# Patient Record
Sex: FEMALE | Race: BLACK OR AFRICAN AMERICAN | NOT HISPANIC OR LATINO | URBAN - METROPOLITAN AREA
[De-identification: names, ages, dates, MRNs, and addresses within clinical notes are randomized per-mention and may not be internally consistent; named-entity substitution may affect disease eponyms.]

---

## 2020-09-20 ENCOUNTER — OUT OF OFFICE VISIT (OUTPATIENT)
Dept: URBAN - METROPOLITAN AREA MEDICAL CENTER 12 | Facility: MEDICAL CENTER | Age: 35
End: 2020-09-20
Payer: MEDICARE

## 2020-09-20 DIAGNOSIS — R11.11 NON-INTRACTABLE VOMITING WITHOUT NAUSEA, UNSPECIFIED VOMITING TYPE: ICD-10-CM

## 2020-09-20 PROCEDURE — G8427 DOCREV CUR MEDS BY ELIG CLIN: HCPCS | Performed by: INTERNAL MEDICINE

## 2020-09-20 PROCEDURE — 99222 1ST HOSP IP/OBS MODERATE 55: CPT | Performed by: INTERNAL MEDICINE

## 2020-09-21 ENCOUNTER — OUT OF OFFICE VISIT (OUTPATIENT)
Dept: URBAN - METROPOLITAN AREA MEDICAL CENTER 12 | Facility: MEDICAL CENTER | Age: 35
End: 2020-09-21
Payer: MEDICARE

## 2020-09-21 DIAGNOSIS — Z79.01 ANTICOAGULANT LONG-TERM USE: ICD-10-CM

## 2020-09-21 DIAGNOSIS — R10.84 ABDOMINAL CRAMPING, GENERALIZED: ICD-10-CM

## 2020-09-21 DIAGNOSIS — I82.629 DVT OF UPPER EXTREMITY (DEEP VEIN THROMBOSIS): ICD-10-CM

## 2020-09-21 DIAGNOSIS — R11.2 ACUTE NAUSEA WITH NONBILIOUS VOMITING: ICD-10-CM

## 2020-09-21 PROCEDURE — 99233 SBSQ HOSP IP/OBS HIGH 50: CPT | Performed by: INTERNAL MEDICINE

## 2020-09-21 PROCEDURE — 99231 SBSQ HOSP IP/OBS SF/LOW 25: CPT | Performed by: INTERNAL MEDICINE

## 2020-09-21 PROCEDURE — 99232 SBSQ HOSP IP/OBS MODERATE 35: CPT | Performed by: INTERNAL MEDICINE

## 2020-09-23 ENCOUNTER — OUT OF OFFICE VISIT (OUTPATIENT)
Dept: URBAN - METROPOLITAN AREA MEDICAL CENTER 12 | Facility: MEDICAL CENTER | Age: 35
End: 2020-09-23
Payer: MEDICARE

## 2020-09-23 DIAGNOSIS — R11.11 VOMITING WITHOUT NAUSEA: ICD-10-CM

## 2020-09-23 DIAGNOSIS — K31.84 DIABETIC GASTROPARESIS: ICD-10-CM

## 2020-09-23 DIAGNOSIS — K31.89 ACQUIRED DEFORMITY OF DUODENUM: ICD-10-CM

## 2020-09-23 DIAGNOSIS — K22.8 COLUMNAR-LINED ESOPHAGUS: ICD-10-CM

## 2020-09-23 PROCEDURE — 43239 EGD BIOPSY SINGLE/MULTIPLE: CPT | Performed by: INTERNAL MEDICINE

## 2020-09-23 PROCEDURE — 43236 UPPR GI SCOPE W/SUBMUC INJ: CPT | Performed by: INTERNAL MEDICINE

## 2020-11-09 ENCOUNTER — OUT OF OFFICE VISIT (OUTPATIENT)
Dept: URBAN - METROPOLITAN AREA MEDICAL CENTER 12 | Facility: MEDICAL CENTER | Age: 35
End: 2020-11-09
Payer: MEDICARE

## 2020-11-09 DIAGNOSIS — K59.09 CHRONIC CONSTIPATION: ICD-10-CM

## 2020-11-09 DIAGNOSIS — R56.9 SEIZURE: ICD-10-CM

## 2020-11-09 DIAGNOSIS — R11.2 ACUTE NAUSEA WITH NONBILIOUS VOMITING: ICD-10-CM

## 2020-11-09 DIAGNOSIS — E10.43 TYPE 1 DIABETES MELLITUS WITH DIABETIC AUTONOMIC (POLY)NEUROPATHY: ICD-10-CM

## 2020-11-09 DIAGNOSIS — D64.89 ANEMIA DUE TO OTHER CAUSE: ICD-10-CM

## 2020-11-09 DIAGNOSIS — R10.84 ABDOMINAL CRAMPING, GENERALIZED: ICD-10-CM

## 2020-11-09 PROCEDURE — 99232 SBSQ HOSP IP/OBS MODERATE 35: CPT | Performed by: INTERNAL MEDICINE

## 2020-11-09 PROCEDURE — 99222 1ST HOSP IP/OBS MODERATE 55: CPT | Performed by: INTERNAL MEDICINE

## 2020-11-09 PROCEDURE — G8427 DOCREV CUR MEDS BY ELIG CLIN: HCPCS | Performed by: INTERNAL MEDICINE

## 2020-11-17 ENCOUNTER — OUT OF OFFICE VISIT (OUTPATIENT)
Dept: URBAN - METROPOLITAN AREA MEDICAL CENTER 12 | Facility: MEDICAL CENTER | Age: 35
End: 2020-11-17
Payer: MEDICARE

## 2020-11-17 DIAGNOSIS — R11.2 ACUTE NAUSEA WITH NONBILIOUS VOMITING: ICD-10-CM

## 2020-11-17 DIAGNOSIS — R56.9 SEIZURE: ICD-10-CM

## 2020-11-17 DIAGNOSIS — E10.43 TYPE 1 DIABETES MELLITUS WITH DIABETIC AUTONOMIC NEUROPATHY: ICD-10-CM

## 2020-11-17 DIAGNOSIS — K59.09 CHRONIC CONSTIPATION: ICD-10-CM

## 2020-11-17 PROCEDURE — 99232 SBSQ HOSP IP/OBS MODERATE 35: CPT | Performed by: INTERNAL MEDICINE

## 2020-11-17 PROCEDURE — 99233 SBSQ HOSP IP/OBS HIGH 50: CPT | Performed by: INTERNAL MEDICINE

## 2020-11-17 PROCEDURE — 99231 SBSQ HOSP IP/OBS SF/LOW 25: CPT | Performed by: INTERNAL MEDICINE

## 2020-12-22 ENCOUNTER — OFFICE VISIT (OUTPATIENT)
Dept: URBAN - METROPOLITAN AREA CLINIC 92 | Facility: CLINIC | Age: 35
End: 2020-12-22

## 2021-01-28 ENCOUNTER — TELEPHONE ENCOUNTER (OUTPATIENT)
Dept: URBAN - METROPOLITAN AREA CLINIC 25 | Facility: CLINIC | Age: 36
End: 2021-01-28

## 2021-02-08 ENCOUNTER — OFFICE VISIT (OUTPATIENT)
Dept: URBAN - METROPOLITAN AREA CLINIC 25 | Facility: CLINIC | Age: 36
End: 2021-02-08
Payer: MEDICARE

## 2021-02-08 DIAGNOSIS — K44.9 HIATAL HERNIA: ICD-10-CM

## 2021-02-08 DIAGNOSIS — K31.84 GASTROPARESIS: ICD-10-CM

## 2021-02-08 DIAGNOSIS — Z86.718 HISTORY OF DVT (DEEP VEIN THROMBOSIS): ICD-10-CM

## 2021-02-08 DIAGNOSIS — E10.69 TYPE 1 DIABETES MELLITUS WITH OTHER SPECIFIED COMPLICATION: ICD-10-CM

## 2021-02-08 DIAGNOSIS — Z93.4 GASTROJEJUNOSTOMY TUBE STATUS: ICD-10-CM

## 2021-02-08 DIAGNOSIS — I10 HYPERTENSION, UNSPECIFIED TYPE: ICD-10-CM

## 2021-02-08 DIAGNOSIS — E11.43 TYPE 2 DIABETES MELLITUS WITH DIABETIC AUTONOMIC (POLY)NEUROPATHY: ICD-10-CM

## 2021-02-08 DIAGNOSIS — E78.5 HYPERLIPIDEMIA, UNSPECIFIED HYPERLIPIDEMIA TYPE: ICD-10-CM

## 2021-02-08 PROBLEM — 55822004: Status: ACTIVE | Noted: 2021-02-08

## 2021-02-08 PROBLEM — 713704004: Status: ACTIVE | Noted: 2021-02-08

## 2021-02-08 PROBLEM — 46635009: Status: ACTIVE | Noted: 2021-02-08

## 2021-02-08 PROBLEM — 235675006: Status: ACTIVE | Noted: 2021-02-08

## 2021-02-08 PROBLEM — 38341003: Status: ACTIVE | Noted: 2021-02-08

## 2021-02-08 PROCEDURE — 99213 OFFICE O/P EST LOW 20 MIN: CPT | Performed by: INTERNAL MEDICINE

## 2021-02-08 PROCEDURE — G8752 SYS BP LESS 140: HCPCS | Performed by: INTERNAL MEDICINE

## 2021-02-08 PROCEDURE — G9903 PT SCRN TBCO ID AS NON USER: HCPCS | Performed by: INTERNAL MEDICINE

## 2021-02-08 PROCEDURE — 1036F TOBACCO NON-USER: CPT | Performed by: INTERNAL MEDICINE

## 2021-02-08 PROCEDURE — G8483 FLU IMM NO ADMIN DOC REA: HCPCS | Performed by: INTERNAL MEDICINE

## 2021-02-08 PROCEDURE — G8427 DOCREV CUR MEDS BY ELIG CLIN: HCPCS | Performed by: INTERNAL MEDICINE

## 2021-02-08 PROCEDURE — G8420 CALC BMI NORM PARAMETERS: HCPCS | Performed by: INTERNAL MEDICINE

## 2021-02-08 PROCEDURE — G8754 DIAS BP LESS 90: HCPCS | Performed by: INTERNAL MEDICINE

## 2021-03-15 ENCOUNTER — TELEPHONE ENCOUNTER (OUTPATIENT)
Dept: URBAN - METROPOLITAN AREA CLINIC 25 | Facility: CLINIC | Age: 36
End: 2021-03-15

## 2021-03-19 ENCOUNTER — TELEPHONE ENCOUNTER (OUTPATIENT)
Dept: URBAN - METROPOLITAN AREA SURGERY CENTER 30 | Facility: SURGERY CENTER | Age: 36
End: 2021-03-19

## 2023-01-03 ENCOUNTER — OUT OF OFFICE VISIT (OUTPATIENT)
Dept: URBAN - METROPOLITAN AREA MEDICAL CENTER 12 | Facility: MEDICAL CENTER | Age: 38
End: 2023-01-03
Payer: MEDICARE

## 2023-01-03 DIAGNOSIS — Z87.19 ESOPHAGEAL FOOD BOLUS: ICD-10-CM

## 2023-01-03 DIAGNOSIS — D64.89 ANEMIA DUE TO OTHER CAUSE: ICD-10-CM

## 2023-01-03 DIAGNOSIS — R10.84 ABDOMINAL CRAMPING, GENERALIZED: ICD-10-CM

## 2023-01-03 DIAGNOSIS — R11.2 ACUTE NAUSEA WITH NONBILIOUS VOMITING: ICD-10-CM

## 2023-01-03 PROCEDURE — G8427 DOCREV CUR MEDS BY ELIG CLIN: HCPCS | Performed by: PHYSICIAN ASSISTANT

## 2023-01-03 PROCEDURE — 99222 1ST HOSP IP/OBS MODERATE 55: CPT | Performed by: PHYSICIAN ASSISTANT

## 2023-01-03 PROCEDURE — 99232 SBSQ HOSP IP/OBS MODERATE 35: CPT | Performed by: PHYSICIAN ASSISTANT

## 2023-01-04 ENCOUNTER — OUT OF OFFICE VISIT (OUTPATIENT)
Dept: URBAN - METROPOLITAN AREA MEDICAL CENTER 12 | Facility: MEDICAL CENTER | Age: 38
End: 2023-01-04
Payer: MEDICARE

## 2023-01-04 DIAGNOSIS — D13.2 ADENOMA OF DUODENUM: ICD-10-CM

## 2023-01-04 DIAGNOSIS — K22.89 DILATATION OF ESOPHAGUS: ICD-10-CM

## 2023-01-04 DIAGNOSIS — K29.60 ADENOPAPILLOMATOSIS GASTRICA: ICD-10-CM

## 2023-01-04 PROCEDURE — 43251 EGD REMOVE LESION SNARE: CPT | Performed by: INTERNAL MEDICINE

## 2023-01-04 PROCEDURE — 43239 EGD BIOPSY SINGLE/MULTIPLE: CPT | Performed by: INTERNAL MEDICINE

## 2023-01-18 ENCOUNTER — TELEPHONE ENCOUNTER (OUTPATIENT)
Dept: URBAN - METROPOLITAN AREA CLINIC 92 | Facility: CLINIC | Age: 38
End: 2023-01-18

## 2023-01-30 ENCOUNTER — OFFICE VISIT (OUTPATIENT)
Dept: URBAN - METROPOLITAN AREA CLINIC 92 | Facility: CLINIC | Age: 38
End: 2023-01-30

## 2023-01-30 NOTE — HPI-TODAY'S VISIT:
1/3/23 Edna Jeffrey is a 37 y.o. female with past medical history significant for ESRD on HD, poorly-controlled IDDM c/b gastroparesis, HTN, PNES, HTN, visual impairment, depression, insomnia, morbid obesity who presented with complaint of nausea, vomiting, abdominal pain.  GI consult requested.   Patient reports a 2-day history of inability to tolerate po intake, intractable nausea and vomiting. She was with complaint of diffuse abdominal pain yesterday, but that has since resolved. Today, she continues to endorse to nausea and vomiting. Clear liquid tray at bedside, she states she has been unable to tolerate without vomiting.  She has not had a bowel movement in several days. She denies chest pain, fevers, melena, hematochezia, diarrhea, hematemesis.   She was recently admitted to [facility] from 12/13 to 12/19 for sepsis due to staph capitis and discharged home on vancomycin (10-day course, yet unable to complete course due to N/V).   Patient known to [facility], last seen at [facility] in 11/2020 for GP flare, and was seen by IR with placement of G-J tube. G-J tube has since been removed. She does not have interest in having this replaced, states it caused her pain and did not help.  History of EGD 09/23/20 revealed z-line irregular at GEJ, 2 cm hiatal hernia, erythematous mucosa in gastric body and antrum, prepyloric region of stomach successfully injected with Botox. She states she did not find relief after Botox.  She denies use of NSAIDs. She endorses to history of marijuana use, but not current. She states when her blood sugars are well-controlled she sees improvement in symptoms.   During this admission, she was noted with QTC prolongation. EGD - [place]-colored mucosa. Biopsied.(	Squamous mucosa with rare superficial intraepithelial neutrophils and reactive changes. 	Minute strips of detached desquamated parakeratotic debris with occasional lymphocytes. )Z-line irregular, 40 cm from the incisors.Gastritis-Biopsied.A 5mm single Duodenal adenoma.Use Protonix (pantoprazole) 40 mg PO BID. NEED EGD IN 3 MONTHS    -Home regimen: Phenergan 25 mg PO Q6H PRN.  She says that Zofran and reglan is always ineffective for her and her QTc interval from last admission > 500 ms.  GI consulted, and rec avoiding antiemetics that further prolong Qtc, start on Tigan and stop ativan due to sedation.  However patient refused IM Tigan.  She underwent EGD with slight gastritis and salmon coloured discoloration, with biopsies taken, continued on PPI BID.  Her diet was advanced and she tolerated

## 2023-01-31 ENCOUNTER — TELEPHONE ENCOUNTER (OUTPATIENT)
Dept: URBAN - METROPOLITAN AREA CLINIC 92 | Facility: CLINIC | Age: 38
End: 2023-01-31

## 2023-02-14 ENCOUNTER — DASHBOARD ENCOUNTERS (OUTPATIENT)
Age: 38
End: 2023-02-14

## 2023-02-15 ENCOUNTER — OFFICE VISIT (OUTPATIENT)
Dept: URBAN - METROPOLITAN AREA CLINIC 92 | Facility: CLINIC | Age: 38
End: 2023-02-15

## 2023-02-15 NOTE — HPI-TODAY'S VISIT:
1/3/23 Edna Jeffrey is a 37 y.o. female with past medical history significant for ESRD on HD, poorly-controlled IDDM c/b gastroparesis, HTN, PNES, HTN, visual impairment, depression, insomnia, morbid obesity who presented with complaint of nausea, vomiting, abdominal pain.  GI consult requested.   Patient reports a 2-day history of inability to tolerate po intake, intractable nausea and vomiting. She was with complaint of diffuse abdominal pain yesterday, but that has since resolved. Today, she continues to endorse to nausea and vomiting. Clear liquid tray at bedside, she states she has been unable to tolerate without vomiting.  She has not had a bowel movement in several days. She denies chest pain, fevers, melena, hematochezia, diarrhea, hematemesis.   She was recently admitted to [facility] from 12/13 to 12/19 for sepsis due to staph capitis and discharged home on vancomycin (10-day course, yet unable to complete course due to N/V).   Patient known to [facility], last seen at [facility] in 11/2020 for GP flare, and was seen by IR with placement of G-J tube. G-J tube has since been removed. She does not have interest in having this replaced, states it caused her pain and did not help.  History of EGD 09/23/20 revealed z-line irregular at GEJ, 2 cm hiatal hernia, erythematous mucosa in gastric body and antrum, prepyloric region of stomach successfully injected with Botox. She states she did not find relief after Botox.  She denies use of NSAIDs. She endorses to history of marijuana use, but not current. She states when her blood sugars are well-controlled she sees improvement in symptoms.   During this admission, she was noted with QTC prolongation. EGD - [place]-colored mucosa. Biopsied.(	Squamous mucosa with rare superficial intraepithelial neutrophils and reactive changes. 	Minute strips of detached desquamated parakeratotic debris with occasional lymphocytes. )Z-line irregular, 40 cm from the incisors.Gastritis-Biopsied.A 5mm single Duodenal adenoma.Use Protonix (pantoprazole) 40 mg PO BID. NEED EGD IN 3 MONTHS    -Home regimen: Phenergan 25 mg PO Q6H PRN.  She says that Zofran and reglan is always ineffective for her and her QTc interval from last admission > 500 ms.  GI consulted, and rec avoiding antiemetics that further prolong Qtc, start on Tigan and stop ativan due to sedation.  However patient refused IM Tigan.  She underwent EGD with slight gastritis and salmon coloured discoloration, with biopsies taken, continued on PPI BID.  Her diet was advanced and she tolerated

## 2023-03-22 ENCOUNTER — CLAIMS CREATED FROM THE CLAIM WINDOW (OUTPATIENT)
Dept: URBAN - METROPOLITAN AREA MEDICAL CENTER 12 | Facility: MEDICAL CENTER | Age: 38
End: 2023-03-22
Payer: MEDICARE

## 2023-03-22 ENCOUNTER — CLAIMS CREATED FROM THE CLAIM WINDOW (OUTPATIENT)
Dept: URBAN - METROPOLITAN AREA MEDICAL CENTER 12 | Facility: MEDICAL CENTER | Age: 38
End: 2023-03-22

## 2023-03-22 DIAGNOSIS — K31.84 GASTROPARESIS: ICD-10-CM

## 2023-03-22 DIAGNOSIS — R11.2 ACUTE NAUSEA WITH NONBILIOUS VOMITING: ICD-10-CM

## 2023-03-22 DIAGNOSIS — R10.32 ABDOMINAL CRAMPING IN LEFT LOWER QUADRANT: ICD-10-CM

## 2023-03-22 DIAGNOSIS — E10.43 GASTROPARESIS DUE TO TYPE 1 DIABETES MELLITUS: ICD-10-CM

## 2023-03-22 PROCEDURE — G8427 DOCREV CUR MEDS BY ELIG CLIN: HCPCS | Performed by: PHYSICIAN ASSISTANT

## 2023-03-22 PROCEDURE — 99222 1ST HOSP IP/OBS MODERATE 55: CPT | Performed by: PHYSICIAN ASSISTANT

## 2023-03-23 ENCOUNTER — OUT OF OFFICE VISIT (OUTPATIENT)
Dept: URBAN - METROPOLITAN AREA MEDICAL CENTER 12 | Facility: MEDICAL CENTER | Age: 38
End: 2023-03-23

## 2023-03-23 ENCOUNTER — CLAIMS CREATED FROM THE CLAIM WINDOW (OUTPATIENT)
Dept: URBAN - METROPOLITAN AREA MEDICAL CENTER 12 | Facility: MEDICAL CENTER | Age: 38
End: 2023-03-23
Payer: MEDICARE

## 2023-03-23 DIAGNOSIS — D13.2 ADENOMA OF DUODENUM: ICD-10-CM

## 2023-03-23 PROCEDURE — 43235 EGD DIAGNOSTIC BRUSH WASH: CPT | Performed by: INTERNAL MEDICINE

## 2023-05-09 ENCOUNTER — OFFICE VISIT (OUTPATIENT)
Dept: URBAN - METROPOLITAN AREA CLINIC 92 | Facility: CLINIC | Age: 38
End: 2023-05-09

## 2023-05-09 NOTE — HPI-TODAY'S VISIT:
1/3/23 Edna Jeffrey is a 37 y.o. female with past medical history significant for ESRD on HD, poorly-controlled IDDM c/b gastroparesis, HTN, PNES, HTN, visual impairment, depression, insomnia, morbid obesity who presented with complaint of nausea, vomiting, abdominal pain.  GI consult requested.   Patient reports a 2-day history of inability to tolerate po intake, intractable nausea and vomiting. She was with complaint of diffuse abdominal pain yesterday, but that has since resolved. Today, she continues to endorse to nausea and vomiting. Clear liquid tray at bedside, she states she has been unable to tolerate without vomiting.  She has not had a bowel movement in several days. She denies chest pain, fevers, melena, hematochezia, diarrhea, hematemesis.   She was recently admitted to [facility] from 12/13 to 12/19 for sepsis due to staph capitis and discharged home on vancomycin (10-day course, yet unable to complete course due to N/V).   Patient known to [facility], last seen at [facility] in 11/2020 for GP flare, and was seen by IR with placement of G-J tube. G-J tube has since been removed. She does not have interest in having this replaced, states it caused her pain and did not help.  History of EGD 09/23/20 revealed z-line irregular at GEJ, 2 cm hiatal hernia, erythematous mucosa in gastric body and antrum, prepyloric region of stomach successfully injected with Botox. She states she did not find relief after Botox.  She denies use of NSAIDs. She endorses to history of marijuana use, but not current. She states when her blood sugars are well-controlled she sees improvement in symptoms.   During this admission, she was noted with QTC prolongation. EGD - [place]-colored mucosa. Biopsied.(	Squamous mucosa with rare superficial intraepithelial neutrophils and reactive changes. 	Minute strips of detached desquamated parakeratotic debris with occasional lymphocytes. )Z-line irregular, 40 cm from the incisors.Gastritis-Biopsied.A 5mm single Duodenal adenoma.Use Protonix (pantoprazole) 40 mg PO BID. NEED EGD IN 3 MONTHS    -Home regimen: Phenergan 25 mg PO Q6H PRN.  She says that Zofran and reglan is always ineffective for her and her QTc interval from last admission > 500 ms.  GI consulted, and rec avoiding antiemetics that further prolong Qtc, start on Tigan and stop ativan due to sedation.  However patient refused IM Tigan.  She underwent EGD with slight gastritis and salmon coloured discoloration, with biopsies taken, continued on PPI BID.  Her diet was advanced and she tolerated

## 2023-10-12 ENCOUNTER — OUT OF OFFICE VISIT (OUTPATIENT)
Dept: URBAN - METROPOLITAN AREA MEDICAL CENTER 39 | Facility: MEDICAL CENTER | Age: 38
End: 2023-10-12
Payer: MEDICARE

## 2023-10-12 DIAGNOSIS — K31.84 DECREASED MOTILITY OF STOMACH: ICD-10-CM

## 2023-10-12 DIAGNOSIS — R10.84 ABDOMINAL CRAMPING, GENERALIZED: ICD-10-CM

## 2023-10-12 DIAGNOSIS — R11.0 AM NAUSEA: ICD-10-CM

## 2023-10-12 PROCEDURE — G8427 DOCREV CUR MEDS BY ELIG CLIN: HCPCS | Performed by: INTERNAL MEDICINE

## 2023-10-12 PROCEDURE — 99222 1ST HOSP IP/OBS MODERATE 55: CPT | Performed by: INTERNAL MEDICINE

## 2024-01-26 ENCOUNTER — OUT OF OFFICE VISIT (OUTPATIENT)
Dept: URBAN - METROPOLITAN AREA MEDICAL CENTER 39 | Facility: MEDICAL CENTER | Age: 39
End: 2024-01-26
Payer: MEDICARE

## 2024-01-26 DIAGNOSIS — R11.2 ACUTE NAUSEA WITH NONBILIOUS VOMITING: ICD-10-CM

## 2024-01-26 DIAGNOSIS — K31.84 DECREASED MOTILITY OF STOMACH: ICD-10-CM

## 2024-01-26 PROCEDURE — 99222 1ST HOSP IP/OBS MODERATE 55: CPT | Performed by: INTERNAL MEDICINE

## 2024-01-26 PROCEDURE — G8427 DOCREV CUR MEDS BY ELIG CLIN: HCPCS | Performed by: INTERNAL MEDICINE

## 2024-03-11 ENCOUNTER — LAB (OUTPATIENT)
Dept: URBAN - METROPOLITAN AREA MEDICAL CENTER 9 | Facility: MEDICAL CENTER | Age: 39
End: 2024-03-11
Payer: MEDICARE

## 2024-03-11 DIAGNOSIS — R11.2 NAUSEA WITH VOMITING, UNSPECIFIED: ICD-10-CM

## 2024-03-11 DIAGNOSIS — E11.43 DIABETIC GASTROPARESIS: ICD-10-CM

## 2024-03-11 DIAGNOSIS — K31.84 GASTROPARESIS: ICD-10-CM

## 2024-03-11 PROCEDURE — G8427 DOCREV CUR MEDS BY ELIG CLIN: HCPCS | Performed by: INTERNAL MEDICINE

## 2024-03-11 PROCEDURE — 99254 IP/OBS CNSLTJ NEW/EST MOD 60: CPT | Performed by: INTERNAL MEDICINE

## 2024-03-11 PROCEDURE — 99222 1ST HOSP IP/OBS MODERATE 55: CPT | Performed by: INTERNAL MEDICINE

## 2024-03-12 ENCOUNTER — LAB (OUTPATIENT)
Dept: URBAN - METROPOLITAN AREA MEDICAL CENTER 9 | Facility: MEDICAL CENTER | Age: 39
End: 2024-03-12
Payer: MEDICARE

## 2024-03-12 DIAGNOSIS — K31.84 GASTROPARESIS: ICD-10-CM

## 2024-03-12 DIAGNOSIS — E11.43 DIABETIC GASTROPARESIS: ICD-10-CM

## 2024-03-12 DIAGNOSIS — R11.2 NAUSEA WITH VOMITING, UNSPECIFIED: ICD-10-CM

## 2024-03-12 PROCEDURE — 99231 SBSQ HOSP IP/OBS SF/LOW 25: CPT | Performed by: PHYSICIAN ASSISTANT

## 2024-06-21 ENCOUNTER — CLAIMS CREATED FROM THE CLAIM WINDOW (OUTPATIENT)
Dept: URBAN - METROPOLITAN AREA MEDICAL CENTER 33 | Facility: MEDICAL CENTER | Age: 39
End: 2024-06-21
Payer: MEDICARE

## 2024-06-21 DIAGNOSIS — E11.43 TYPE 2 DIABETES MELLITUS WITH DIABETIC AUTONOMIC (POLY)NEUROPATHY: ICD-10-CM

## 2024-06-21 DIAGNOSIS — K31.84 GASTROPARESIS: ICD-10-CM

## 2024-06-21 DIAGNOSIS — E87.1 HYPONATREMIA: ICD-10-CM

## 2024-06-21 DIAGNOSIS — R11.2 ACUTE NAUSEA WITH NONBILIOUS VOMITING: ICD-10-CM

## 2024-06-21 PROCEDURE — G8427 DOCREV CUR MEDS BY ELIG CLIN: HCPCS

## 2024-06-21 PROCEDURE — 99222 1ST HOSP IP/OBS MODERATE 55: CPT

## 2024-06-22 ENCOUNTER — CLAIMS CREATED FROM THE CLAIM WINDOW (OUTPATIENT)
Dept: URBAN - METROPOLITAN AREA MEDICAL CENTER 33 | Facility: MEDICAL CENTER | Age: 39
End: 2024-06-22
Payer: MEDICARE

## 2024-06-22 DIAGNOSIS — R11.2 ACUTE NAUSEA WITH NONBILIOUS VOMITING: ICD-10-CM

## 2024-06-22 DIAGNOSIS — K59.09 CHANGE IN BOWEL MOVEMENTS INTERMITTENT CONSTIPATION. URGENCY IN THE MORNING.: ICD-10-CM

## 2024-06-22 PROCEDURE — 99233 SBSQ HOSP IP/OBS HIGH 50: CPT | Performed by: INTERNAL MEDICINE

## 2024-06-24 ENCOUNTER — CLAIMS CREATED FROM THE CLAIM WINDOW (OUTPATIENT)
Dept: URBAN - METROPOLITAN AREA MEDICAL CENTER 33 | Facility: MEDICAL CENTER | Age: 39
End: 2024-06-24
Payer: MEDICARE

## 2024-06-24 DIAGNOSIS — K21.00 ALKALINE REFLUX ESOPHAGITIS: ICD-10-CM

## 2024-06-24 DIAGNOSIS — R11.2 ACUTE NAUSEA WITH NONBILIOUS VOMITING: ICD-10-CM

## 2024-06-24 PROCEDURE — 43236 UPPR GI SCOPE W/SUBMUC INJ: CPT | Performed by: INTERNAL MEDICINE

## 2024-06-24 PROCEDURE — 43235 EGD DIAGNOSTIC BRUSH WASH: CPT | Performed by: INTERNAL MEDICINE

## 2024-06-25 ENCOUNTER — CLAIMS CREATED FROM THE CLAIM WINDOW (OUTPATIENT)
Dept: URBAN - METROPOLITAN AREA MEDICAL CENTER 33 | Facility: MEDICAL CENTER | Age: 39
End: 2024-06-25
Payer: MEDICARE

## 2024-06-25 DIAGNOSIS — K21.00 ALKALINE REFLUX ESOPHAGITIS: ICD-10-CM

## 2024-06-25 DIAGNOSIS — R11.2 ACUTE NAUSEA WITH NONBILIOUS VOMITING: ICD-10-CM

## 2024-06-25 DIAGNOSIS — K31.84 GASTROPARESIS: ICD-10-CM

## 2024-06-25 DIAGNOSIS — E11.43 TYPE 2 DIABETES MELLITUS WITH DIABETIC AUTONOMIC (POLY)NEUROPATHY: ICD-10-CM

## 2024-06-25 PROCEDURE — 99232 SBSQ HOSP IP/OBS MODERATE 35: CPT | Performed by: INTERNAL MEDICINE

## 2024-06-26 ENCOUNTER — CLAIMS CREATED FROM THE CLAIM WINDOW (OUTPATIENT)
Dept: URBAN - METROPOLITAN AREA MEDICAL CENTER 33 | Facility: MEDICAL CENTER | Age: 39
End: 2024-06-26
Payer: MEDICARE

## 2024-06-26 DIAGNOSIS — E11.43 TYPE 2 DIABETES MELLITUS WITH DIABETIC AUTONOMIC (POLY)NEUROPATHY: ICD-10-CM

## 2024-06-26 DIAGNOSIS — K31.84 GASTROPARESIS: ICD-10-CM

## 2024-06-26 DIAGNOSIS — R11.2 ACUTE NAUSEA WITH NONBILIOUS VOMITING: ICD-10-CM

## 2024-06-26 DIAGNOSIS — K21.00 ALKALINE REFLUX ESOPHAGITIS: ICD-10-CM

## 2024-06-26 PROCEDURE — 99232 SBSQ HOSP IP/OBS MODERATE 35: CPT | Performed by: INTERNAL MEDICINE

## 2024-06-28 ENCOUNTER — CLAIMS CREATED FROM THE CLAIM WINDOW (OUTPATIENT)
Dept: URBAN - METROPOLITAN AREA MEDICAL CENTER 33 | Facility: MEDICAL CENTER | Age: 39
End: 2024-06-28

## 2024-06-28 PROCEDURE — 99232 SBSQ HOSP IP/OBS MODERATE 35: CPT | Performed by: INTERNAL MEDICINE

## 2024-08-04 ENCOUNTER — CLAIMS CREATED FROM THE CLAIM WINDOW (OUTPATIENT)
Dept: URBAN - METROPOLITAN AREA MEDICAL CENTER 33 | Facility: MEDICAL CENTER | Age: 39
End: 2024-08-04
Payer: MEDICARE

## 2024-08-04 DIAGNOSIS — K31.84 DECREASED MOTILITY OF STOMACH: ICD-10-CM

## 2024-08-04 DIAGNOSIS — R11.2 ACUTE NAUSEA WITH NONBILIOUS VOMITING: ICD-10-CM

## 2024-08-04 DIAGNOSIS — E10.65 DIABETES MELLITUS TYPE 1, UNCONTROLLED, INSULIN DEPENDENT: ICD-10-CM

## 2024-08-04 PROCEDURE — 99223 1ST HOSP IP/OBS HIGH 75: CPT | Performed by: INTERNAL MEDICINE

## 2024-08-04 PROCEDURE — G8427 DOCREV CUR MEDS BY ELIG CLIN: HCPCS | Performed by: INTERNAL MEDICINE

## 2024-08-05 ENCOUNTER — CLAIMS CREATED FROM THE CLAIM WINDOW (OUTPATIENT)
Dept: URBAN - METROPOLITAN AREA MEDICAL CENTER 33 | Facility: MEDICAL CENTER | Age: 39
End: 2024-08-05
Payer: MEDICARE

## 2024-08-05 DIAGNOSIS — K31.84 DECREASED MOTILITY OF STOMACH: ICD-10-CM

## 2024-08-05 DIAGNOSIS — E10.65 DIABETES MELLITUS TYPE 1, UNCONTROLLED, INSULIN DEPENDENT: ICD-10-CM

## 2024-08-05 DIAGNOSIS — R11.2 ACUTE NAUSEA WITH NONBILIOUS VOMITING: ICD-10-CM

## 2024-08-05 DIAGNOSIS — D64.89 ANEMIA DUE TO OTHER CAUSE: ICD-10-CM

## 2024-08-05 PROCEDURE — 99233 SBSQ HOSP IP/OBS HIGH 50: CPT

## 2024-08-06 ENCOUNTER — CLAIMS CREATED FROM THE CLAIM WINDOW (OUTPATIENT)
Dept: URBAN - METROPOLITAN AREA MEDICAL CENTER 33 | Facility: MEDICAL CENTER | Age: 39
End: 2024-08-06
Payer: MEDICARE

## 2024-08-06 DIAGNOSIS — K59.09 CHANGE IN BOWEL MOVEMENTS INTERMITTENT CONSTIPATION. URGENCY IN THE MORNING.: ICD-10-CM

## 2024-08-06 DIAGNOSIS — D64.89 ANEMIA DUE TO OTHER CAUSE: ICD-10-CM

## 2024-08-06 DIAGNOSIS — R11.2 ACUTE NAUSEA WITH NONBILIOUS VOMITING: ICD-10-CM

## 2024-08-06 DIAGNOSIS — E10.65 DIABETES MELLITUS TYPE 1, UNCONTROLLED, INSULIN DEPENDENT: ICD-10-CM

## 2024-08-06 PROCEDURE — 99232 SBSQ HOSP IP/OBS MODERATE 35: CPT

## 2024-08-06 PROCEDURE — 99233 SBSQ HOSP IP/OBS HIGH 50: CPT

## 2024-10-24 ENCOUNTER — CLAIMS CREATED FROM THE CLAIM WINDOW (OUTPATIENT)
Dept: URBAN - METROPOLITAN AREA MEDICAL CENTER 33 | Facility: MEDICAL CENTER | Age: 39
End: 2024-10-24
Payer: MEDICARE

## 2024-10-24 DIAGNOSIS — F12.180 CANNABIS ABUSE WITH CANNABIS-INDUCED ANXIETY DISORDER: ICD-10-CM

## 2024-10-24 DIAGNOSIS — R13.10 DYSPHAGIA, UNSPECIFIED: ICD-10-CM

## 2024-10-24 DIAGNOSIS — R11.2 NAUSEA WITH VOMITING, UNSPECIFIED: ICD-10-CM

## 2024-10-24 DIAGNOSIS — R10.13 EPIGASTRIC PAIN: ICD-10-CM

## 2024-10-24 PROCEDURE — 99255 IP/OBS CONSLTJ NEW/EST HI 80: CPT

## 2024-10-25 ENCOUNTER — CLAIMS CREATED FROM THE CLAIM WINDOW (OUTPATIENT)
Dept: URBAN - METROPOLITAN AREA MEDICAL CENTER 33 | Facility: MEDICAL CENTER | Age: 39
End: 2024-10-25

## 2024-10-25 PROCEDURE — 99233 SBSQ HOSP IP/OBS HIGH 50: CPT

## 2024-10-28 ENCOUNTER — CLAIMS CREATED FROM THE CLAIM WINDOW (OUTPATIENT)
Dept: URBAN - METROPOLITAN AREA MEDICAL CENTER 33 | Facility: MEDICAL CENTER | Age: 39
End: 2024-10-28

## 2024-10-28 PROCEDURE — 43235 EGD DIAGNOSTIC BRUSH WASH: CPT | Performed by: INTERNAL MEDICINE

## 2024-10-29 ENCOUNTER — CLAIMS CREATED FROM THE CLAIM WINDOW (OUTPATIENT)
Dept: URBAN - METROPOLITAN AREA MEDICAL CENTER 33 | Facility: MEDICAL CENTER | Age: 39
End: 2024-10-29

## 2024-10-29 PROCEDURE — 99232 SBSQ HOSP IP/OBS MODERATE 35: CPT | Performed by: INTERNAL MEDICINE

## 2024-11-01 ENCOUNTER — CLAIMS CREATED FROM THE CLAIM WINDOW (OUTPATIENT)
Dept: URBAN - METROPOLITAN AREA MEDICAL CENTER 12 | Facility: MEDICAL CENTER | Age: 39
End: 2024-11-01
Payer: MEDICARE

## 2024-11-01 DIAGNOSIS — K59.09 OTHER CONSTIPATION: ICD-10-CM

## 2024-11-01 DIAGNOSIS — K62.5 ANAL BLEEDING: ICD-10-CM

## 2024-11-01 DIAGNOSIS — Z87.19 PERSONAL HISTORY OF OTHER DISEASES OF THE DIGESTIVE SYSTEM: ICD-10-CM

## 2024-11-01 DIAGNOSIS — R11.2 ACUTE NAUSEA WITH NONBILIOUS VOMITING: ICD-10-CM

## 2024-11-01 PROCEDURE — G8427 DOCREV CUR MEDS BY ELIG CLIN: HCPCS | Performed by: PHYSICIAN ASSISTANT

## 2024-11-01 PROCEDURE — 99254 IP/OBS CNSLTJ NEW/EST MOD 60: CPT | Performed by: PHYSICIAN ASSISTANT

## 2024-11-01 PROCEDURE — 99222 1ST HOSP IP/OBS MODERATE 55: CPT | Performed by: PHYSICIAN ASSISTANT

## 2024-11-02 ENCOUNTER — CLAIMS CREATED FROM THE CLAIM WINDOW (OUTPATIENT)
Dept: URBAN - METROPOLITAN AREA MEDICAL CENTER 12 | Facility: MEDICAL CENTER | Age: 39
End: 2024-11-02
Payer: MEDICARE

## 2024-11-02 DIAGNOSIS — K59.09 OTHER CONSTIPATION: ICD-10-CM

## 2024-11-02 DIAGNOSIS — K62.5 ANAL BLEEDING: ICD-10-CM

## 2024-11-02 DIAGNOSIS — Z87.19 PERSONAL HISTORY OF OTHER DISEASES OF THE DIGESTIVE SYSTEM: ICD-10-CM

## 2024-11-02 DIAGNOSIS — R11.2 ACUTE NAUSEA WITH NONBILIOUS VOMITING: ICD-10-CM

## 2024-11-02 PROCEDURE — 99232 SBSQ HOSP IP/OBS MODERATE 35: CPT | Performed by: STUDENT IN AN ORGANIZED HEALTH CARE EDUCATION/TRAINING PROGRAM

## 2024-11-04 ENCOUNTER — CLAIMS CREATED FROM THE CLAIM WINDOW (OUTPATIENT)
Dept: URBAN - METROPOLITAN AREA MEDICAL CENTER 12 | Facility: MEDICAL CENTER | Age: 39
End: 2024-11-04
Payer: MEDICARE

## 2024-11-04 DIAGNOSIS — R11.2 ACUTE NAUSEA WITH NONBILIOUS VOMITING: ICD-10-CM

## 2024-11-04 DIAGNOSIS — K62.5 ANAL BLEEDING: ICD-10-CM

## 2024-11-04 DIAGNOSIS — K59.09 OTHER CONSTIPATION: ICD-10-CM

## 2024-11-04 DIAGNOSIS — Z87.19 PERSONAL HISTORY OF OTHER DISEASES OF THE DIGESTIVE SYSTEM: ICD-10-CM

## 2024-11-04 PROCEDURE — 99231 SBSQ HOSP IP/OBS SF/LOW 25: CPT | Performed by: INTERNAL MEDICINE

## 2024-11-05 ENCOUNTER — TELEPHONE ENCOUNTER (OUTPATIENT)
Dept: URBAN - METROPOLITAN AREA CLINIC 105 | Facility: CLINIC | Age: 39
End: 2024-11-05

## 2024-11-05 ENCOUNTER — CLAIMS CREATED FROM THE CLAIM WINDOW (OUTPATIENT)
Dept: URBAN - METROPOLITAN AREA MEDICAL CENTER 12 | Facility: MEDICAL CENTER | Age: 39
End: 2024-11-05
Payer: MEDICARE

## 2024-11-05 DIAGNOSIS — D50.9 ANEMIA: ICD-10-CM

## 2024-11-05 PROCEDURE — 45378 DIAGNOSTIC COLONOSCOPY: CPT | Performed by: INTERNAL MEDICINE

## 2024-11-05 PROCEDURE — 45380 COLONOSCOPY AND BIOPSY: CPT | Performed by: INTERNAL MEDICINE

## 2024-12-05 ENCOUNTER — OFFICE VISIT (OUTPATIENT)
Dept: URBAN - METROPOLITAN AREA CLINIC 92 | Facility: CLINIC | Age: 39
End: 2024-12-05

## 2024-12-05 NOTE — HPI-TODAY'S VISIT:
11/1/24 Edna Jeffrey is a 39 y.o. female with past medical history significant for ESRD on HD T/T/S, HTN, DM c/b diabetic retinopathy and blindness, PAD, PNES, chronic pain, hx of RV thrombus (resolved no longer on Eliquis) who presented with complaint of seizure activity.  GI consult requested for BRBPR.   Patient's description of her stools are limited given blindness. She denies known history of bloody stool prior. She states she was told by nursing staff yesterday that she had red blood in her stool. She states this bowel movement was her first in 2 weeks. She denies straining or rectal pain. She denies known history of hemorrhoids. Per [facility] [place], "At approximate 11:30 patient noted to have blood in brown stool, and some clots, however patient also on menses and reports heavy bleeding. ROOSEVELT at bedside was positive for BRBPR, no stool appreciated in vault." She reports "a little" abdominal pain which is resolved currently. She states the pain was to her right lower quadrant and described as sharp and cramping in nature.    Patient reports chronic nausea and poor po intake. She states she has been with a poor appetite over the past several months and endorses to a weight loss of 40 pounds. She was recently seen at [place] and underwent an "EGD 10/28 showed salmon colored mucosa - biopsied - and erythematous, edematous lesion in duodenum, biopsied" Her symptoms of N/V thought "may be 2/2 to uncontrolled gastroparesis vs GERD/PUD/cyclical vomiting syndrome/cannibinoid hyperemesis syndrome/esophagitis/gastritis." She was treated supportively with antiemetics.    She has never had a colonoscopy. She is not on oral anticoagulation.    CareEverywhere reviewed. She is well-known to [facility] given her gastroparesis and chronic nausea/vomiting. She has had multiple endoscopies and Botox trials as well.    \*Unknown;  noted to have bloody stool and BRBPR in CDU with + ROOSEVELT. Hgb dropping from 12.1 -> 10.5. GI consulted, colonoscopy performed 11/5 with completely normal exam   and no source of bleed identified.  GI recommended high-fiber/low-fat diet, continuing on current medications and repeat colonoscopy at age 45 for routine screening.    EGD 10/28/24: salmon colored mucosa bx of stomach showed mild inflammation no HP or IM, esopghus with mild inflammation, duodenal bx with mild inflammtion.  EGD 6/2024: [place] grade D esophagitis, botox injected.